# Patient Record
Sex: MALE | Race: WHITE | NOT HISPANIC OR LATINO
[De-identification: names, ages, dates, MRNs, and addresses within clinical notes are randomized per-mention and may not be internally consistent; named-entity substitution may affect disease eponyms.]

---

## 2024-01-29 PROBLEM — Z00.00 ENCOUNTER FOR PREVENTIVE HEALTH EXAMINATION: Status: ACTIVE | Noted: 2024-01-29

## 2024-02-07 ENCOUNTER — NON-APPOINTMENT (OUTPATIENT)
Age: 54
End: 2024-02-07

## 2024-02-09 ENCOUNTER — APPOINTMENT (OUTPATIENT)
Dept: CARDIOLOGY | Facility: CLINIC | Age: 54
End: 2024-02-09
Payer: COMMERCIAL

## 2024-02-09 VITALS
DIASTOLIC BLOOD PRESSURE: 84 MMHG | SYSTOLIC BLOOD PRESSURE: 130 MMHG | HEART RATE: 82 BPM | BODY MASS INDEX: 32.29 KG/M2 | WEIGHT: 218 LBS | HEIGHT: 69 IN

## 2024-02-09 DIAGNOSIS — Z82.49 FAMILY HISTORY OF ISCHEMIC HEART DISEASE AND OTHER DISEASES OF THE CIRCULATORY SYSTEM: ICD-10-CM

## 2024-02-09 DIAGNOSIS — I25.84 ATHEROSCLEROTIC HEART DISEASE OF NATIVE CORONARY ARTERY W/OUT ANGINA PECTORIS: ICD-10-CM

## 2024-02-09 DIAGNOSIS — I25.10 ATHEROSCLEROTIC HEART DISEASE OF NATIVE CORONARY ARTERY W/OUT ANGINA PECTORIS: ICD-10-CM

## 2024-02-09 DIAGNOSIS — Z87.891 PERSONAL HISTORY OF NICOTINE DEPENDENCE: ICD-10-CM

## 2024-02-09 DIAGNOSIS — E78.5 HYPERLIPIDEMIA, UNSPECIFIED: ICD-10-CM

## 2024-02-09 PROCEDURE — 93000 ELECTROCARDIOGRAM COMPLETE: CPT

## 2024-02-09 PROCEDURE — 99204 OFFICE O/P NEW MOD 45 MIN: CPT | Mod: 25

## 2024-02-09 RX ORDER — ATORVASTATIN CALCIUM 20 MG/1
20 TABLET, FILM COATED ORAL
Qty: 90 | Refills: 1 | Status: ACTIVE | COMMUNITY
Start: 2024-02-09 | End: 1900-01-01

## 2024-02-09 RX ORDER — LEVOTHYROXINE SODIUM 0.07 MG/1
75 TABLET ORAL DAILY
Refills: 0 | Status: ACTIVE | COMMUNITY

## 2024-02-09 RX ORDER — OMEGA-3-ACID ETHYL ESTERS 1 G/1
1 CAPSULE, LIQUID FILLED ORAL DAILY
Refills: 0 | Status: ACTIVE | COMMUNITY

## 2024-02-10 PROBLEM — I25.10 CORONARY ARTERY CALCIFICATION: Status: ACTIVE | Noted: 2024-02-10

## 2024-02-10 PROBLEM — E78.5 HLD (HYPERLIPIDEMIA): Status: ACTIVE | Noted: 2024-02-09

## 2024-02-10 NOTE — HISTORY OF PRESENT ILLNESS
[FreeTextEntry1] : 53-year-old male with a PMHx of hypothyroid, HLD  Pt presents for a cardiac evaluation. He was referred by his PCP Dr. Bowen. Coronary calcium score 1/2024: 297.  Patient denies CP, SOB.   Trig 161 TSH 3.31

## 2024-02-10 NOTE — ASSESSMENT
[FreeTextEntry1] : 53 YOM former smoking. HLD Coronary artery calcification at 90th percentile for his age.  Plan: Low-fat diet discussed. Start Atorvastatin 20 mg daily. Exercise stress ECHO to r/o obstructive CAD. F/u after the test.  Russell Fernández MD

## 2024-03-12 ENCOUNTER — APPOINTMENT (OUTPATIENT)
Dept: CARDIOLOGY | Facility: CLINIC | Age: 54
End: 2024-03-12

## 2024-12-29 ENCOUNTER — INPATIENT (INPATIENT)
Facility: HOSPITAL | Age: 54
LOS: 1 days | Discharge: HOME | DRG: 312 | End: 2024-12-31
Attending: HOSPITALIST | Admitting: INTERNAL MEDICINE
Payer: COMMERCIAL

## 2024-12-29 VITALS
WEIGHT: 203.05 LBS | OXYGEN SATURATION: 100 % | SYSTOLIC BLOOD PRESSURE: 120 MMHG | HEART RATE: 60 BPM | TEMPERATURE: 97 F | DIASTOLIC BLOOD PRESSURE: 85 MMHG | RESPIRATION RATE: 19 BRPM

## 2024-12-29 LAB
ALBUMIN SERPL ELPH-MCNC: 4.3 G/DL — SIGNIFICANT CHANGE UP (ref 3.5–5.2)
ALP SERPL-CCNC: 83 U/L — SIGNIFICANT CHANGE UP (ref 30–115)
ALT FLD-CCNC: 27 U/L — SIGNIFICANT CHANGE UP (ref 0–41)
ANION GAP SERPL CALC-SCNC: 10 MMOL/L — SIGNIFICANT CHANGE UP (ref 7–14)
AST SERPL-CCNC: 19 U/L — SIGNIFICANT CHANGE UP (ref 0–41)
BASE EXCESS BLDV CALC-SCNC: 4.5 MMOL/L — HIGH (ref -2–3)
BASOPHILS # BLD AUTO: 0.06 K/UL — SIGNIFICANT CHANGE UP (ref 0–0.2)
BASOPHILS NFR BLD AUTO: 0.6 % — SIGNIFICANT CHANGE UP (ref 0–1)
BILIRUB SERPL-MCNC: 0.8 MG/DL — SIGNIFICANT CHANGE UP (ref 0.2–1.2)
BUN SERPL-MCNC: 12 MG/DL — SIGNIFICANT CHANGE UP (ref 10–20)
CA-I SERPL-SCNC: 1.18 MMOL/L — SIGNIFICANT CHANGE UP (ref 1.15–1.33)
CALCIUM SERPL-MCNC: 9.5 MG/DL — SIGNIFICANT CHANGE UP (ref 8.4–10.5)
CHLORIDE SERPL-SCNC: 98 MMOL/L — SIGNIFICANT CHANGE UP (ref 98–110)
CO2 SERPL-SCNC: 27 MMOL/L — SIGNIFICANT CHANGE UP (ref 17–32)
CREAT SERPL-MCNC: 0.9 MG/DL — SIGNIFICANT CHANGE UP (ref 0.7–1.5)
EGFR: 102 ML/MIN/1.73M2 — SIGNIFICANT CHANGE UP
EOSINOPHIL # BLD AUTO: 0.06 K/UL — SIGNIFICANT CHANGE UP (ref 0–0.7)
EOSINOPHIL NFR BLD AUTO: 0.6 % — SIGNIFICANT CHANGE UP (ref 0–8)
GAS PNL BLDV: 130 MMOL/L — LOW (ref 136–145)
GAS PNL BLDV: SIGNIFICANT CHANGE UP
GLUCOSE SERPL-MCNC: 118 MG/DL — HIGH (ref 70–99)
HCO3 BLDV-SCNC: 32 MMOL/L — HIGH (ref 22–29)
HCT VFR BLD CALC: 47.4 % — SIGNIFICANT CHANGE UP (ref 42–52)
HCT VFR BLDA CALC: 64 % — CRITICAL HIGH (ref 39–51)
HGB BLD CALC-MCNC: >20 G/DL — CRITICAL HIGH (ref 12.6–17.4)
HGB BLD-MCNC: 16.1 G/DL — SIGNIFICANT CHANGE UP (ref 14–18)
IMM GRANULOCYTES NFR BLD AUTO: 0.3 % — SIGNIFICANT CHANGE UP (ref 0.1–0.3)
LACTATE BLDV-MCNC: 1.9 MMOL/L — SIGNIFICANT CHANGE UP (ref 0.5–2)
LACTATE SERPL-SCNC: 1.3 MMOL/L — SIGNIFICANT CHANGE UP (ref 0.7–2)
LYMPHOCYTES # BLD AUTO: 1 K/UL — LOW (ref 1.2–3.4)
LYMPHOCYTES # BLD AUTO: 9.3 % — LOW (ref 20.5–51.1)
MAGNESIUM SERPL-MCNC: 2.1 MG/DL — SIGNIFICANT CHANGE UP (ref 1.8–2.4)
MCHC RBC-ENTMCNC: 30.6 PG — SIGNIFICANT CHANGE UP (ref 27–31)
MCHC RBC-ENTMCNC: 34 G/DL — SIGNIFICANT CHANGE UP (ref 32–37)
MCV RBC AUTO: 90.1 FL — SIGNIFICANT CHANGE UP (ref 80–94)
MONOCYTES # BLD AUTO: 0.45 K/UL — SIGNIFICANT CHANGE UP (ref 0.1–0.6)
MONOCYTES NFR BLD AUTO: 4.2 % — SIGNIFICANT CHANGE UP (ref 1.7–9.3)
NEUTROPHILS # BLD AUTO: 9.16 K/UL — HIGH (ref 1.4–6.5)
NEUTROPHILS NFR BLD AUTO: 85 % — HIGH (ref 42.2–75.2)
NRBC # BLD: 0 /100 WBCS — SIGNIFICANT CHANGE UP (ref 0–0)
PCO2 BLDV: 54 MMHG — SIGNIFICANT CHANGE UP (ref 42–55)
PH BLDV: 7.38 — SIGNIFICANT CHANGE UP (ref 7.32–7.43)
PLATELET # BLD AUTO: 196 K/UL — SIGNIFICANT CHANGE UP (ref 130–400)
PMV BLD: 10 FL — SIGNIFICANT CHANGE UP (ref 7.4–10.4)
PO2 BLDV: 27 MMHG — SIGNIFICANT CHANGE UP (ref 25–45)
POTASSIUM BLDV-SCNC: 4.3 MMOL/L — SIGNIFICANT CHANGE UP (ref 3.5–5.1)
POTASSIUM SERPL-MCNC: 4.3 MMOL/L — SIGNIFICANT CHANGE UP (ref 3.5–5)
POTASSIUM SERPL-SCNC: 4.3 MMOL/L — SIGNIFICANT CHANGE UP (ref 3.5–5)
PROT SERPL-MCNC: 7.2 G/DL — SIGNIFICANT CHANGE UP (ref 6–8)
RBC # BLD: 5.26 M/UL — SIGNIFICANT CHANGE UP (ref 4.7–6.1)
RBC # FLD: 12.6 % — SIGNIFICANT CHANGE UP (ref 11.5–14.5)
SAO2 % BLDV: 44.1 % — LOW (ref 67–88)
SODIUM SERPL-SCNC: 135 MMOL/L — SIGNIFICANT CHANGE UP (ref 135–146)
TROPONIN T, HIGH SENSITIVITY RESULT: 8 NG/L — SIGNIFICANT CHANGE UP (ref 6–21)
WBC # BLD: 10.76 K/UL — SIGNIFICANT CHANGE UP (ref 4.8–10.8)
WBC # FLD AUTO: 10.76 K/UL — SIGNIFICANT CHANGE UP (ref 4.8–10.8)

## 2024-12-29 PROCEDURE — 83735 ASSAY OF MAGNESIUM: CPT

## 2024-12-29 PROCEDURE — 36415 COLL VENOUS BLD VENIPUNCTURE: CPT

## 2024-12-29 PROCEDURE — 82550 ASSAY OF CK (CPK): CPT

## 2024-12-29 PROCEDURE — 80053 COMPREHEN METABOLIC PANEL: CPT

## 2024-12-29 PROCEDURE — 95819 EEG AWAKE AND ASLEEP: CPT

## 2024-12-29 PROCEDURE — 84439 ASSAY OF FREE THYROXINE: CPT

## 2024-12-29 PROCEDURE — 93010 ELECTROCARDIOGRAM REPORT: CPT

## 2024-12-29 PROCEDURE — 71045 X-RAY EXAM CHEST 1 VIEW: CPT | Mod: 26

## 2024-12-29 PROCEDURE — A9579: CPT

## 2024-12-29 PROCEDURE — 93880 EXTRACRANIAL BILAT STUDY: CPT

## 2024-12-29 PROCEDURE — 99222 1ST HOSP IP/OBS MODERATE 55: CPT

## 2024-12-29 PROCEDURE — 84484 ASSAY OF TROPONIN QUANT: CPT

## 2024-12-29 PROCEDURE — 80307 DRUG TEST PRSMV CHEM ANLYZR: CPT

## 2024-12-29 PROCEDURE — 70450 CT HEAD/BRAIN W/O DYE: CPT | Mod: 26,MC

## 2024-12-29 PROCEDURE — 84481 FREE ASSAY (FT-3): CPT

## 2024-12-29 PROCEDURE — 85025 COMPLETE CBC W/AUTO DIFF WBC: CPT

## 2024-12-29 PROCEDURE — 99285 EMERGENCY DEPT VISIT HI MDM: CPT

## 2024-12-29 PROCEDURE — 93307 TTE W/O DOPPLER COMPLETE: CPT

## 2024-12-29 PROCEDURE — 75561 CARDIAC MRI FOR MORPH W/DYE: CPT

## 2024-12-29 RX ORDER — LEVOTHYROXINE SODIUM 175 UG/1
75 TABLET ORAL DAILY
Refills: 0 | Status: DISCONTINUED | OUTPATIENT
Start: 2024-12-29 | End: 2024-12-31

## 2024-12-29 RX ORDER — LEVOTHYROXINE SODIUM 175 UG/1
1 TABLET ORAL
Refills: 0 | DISCHARGE

## 2024-12-29 RX ORDER — ENOXAPARIN SODIUM 60 MG/.6ML
40 INJECTION INTRAVENOUS; SUBCUTANEOUS EVERY 24 HOURS
Refills: 0 | Status: DISCONTINUED | OUTPATIENT
Start: 2024-12-29 | End: 2024-12-31

## 2024-12-29 RX ORDER — SODIUM CHLORIDE 9 MG/ML
1000 INJECTION, SOLUTION INTRAVENOUS ONCE
Refills: 0 | Status: COMPLETED | OUTPATIENT
Start: 2024-12-29 | End: 2024-12-29

## 2024-12-29 RX ADMIN — SODIUM CHLORIDE 1000 MILLILITER(S): 9 INJECTION, SOLUTION INTRAVENOUS at 11:38

## 2024-12-29 NOTE — H&P ADULT - ATTENDING COMMENTS
Medicine Attending Addendum  Patient was seen and examined with medicine team.  Nursing records reviewed. I agree with the resident/PA/NP's note including past medical history, home medications, social history, allergies, surgical history, family history, and review of system. I have reviewed relevant vitals, laboratory values, imaging studies, and microbiology.   - Above resident's note was edited by me  - 54 y.o M presenting for syncope of unknown etiology. Admitted to [facility] for syncope work up.  - rest of A/P as per detailed housestaff note above except above modifications    Total time spent to complete patient's bedside assessment, reviewed medical chart, discussed medical plan of care with team was 45 minutes with >50% of time spent face to face with patient, discussion with patient/family and/or coordination of care. Medicine Attending Addendum  Patient was seen and examined with medicine team.  Nursing records reviewed. I agree with the resident/PA/NP's note including past medical history, home medications, social history, allergies, surgical history, family history, and review of system. I have reviewed relevant vitals, laboratory values, imaging studies, and microbiology.   - Above resident's note was edited by me  - 54 y.o M presenting for syncope of unknown etiology, likely vasovagal. Admitted to [facility] for syncope work up.  - rest of A/P as per detailed housestaff note above except above modifications    Total time spent to complete patient's bedside assessment, reviewed medical chart, discussed medical plan of care with team was 45 minutes with >50% of time spent face to face with patient, discussion with patient/family and/or coordination of care.

## 2024-12-29 NOTE — CONSULT NOTE ADULT - ASSESSMENT
53yo w PMH of hypothyroid and hld presents w seizure-like episode Pt admits to prodrome of dizziness and lightheadedness, states he does not recall episode, but wife states he had full body shaking and had urinary incontinence. Following event he felt generally weak. On exam, pt is back to baseline, no focal neurologic deficits noted. Sx may be suggestive of convulsive syncope given prodrome of dizziness/lightheadedness, pt should have further w/u to eval for seizure as well.     RECS  - Obtain orthostatics  - Cardiac syncope w/u  - telemetry monitoring   - Tiago    Discussed w Dr. Smith

## 2024-12-29 NOTE — ED PROVIDER NOTE - OBJECTIVE STATEMENT
54yoM PMHx hypothyroidism presenting for new witnessed seizure this morning. Pt report he had just gotten up out of bed, went downstairs sat on the couch when his wife saw him have a staring episode and then generalized shaking for about 5 minutes. He took about 10-15 minutes to return to baseline. Pt has otherwise been well recently, denies recent illness, fever, or trauma. Pt notes that after regularly tracking his TSH for years, it was elevated on routine labs for the first time 5 days ago, TSH 15. Pt reports feeling tired now, but no other symptoms. Denies chest pain, sob, cough, congestion, runny nose, abdominal pain, nausea, vomiting, headache, dizziness

## 2024-12-29 NOTE — ED ADULT TRIAGE NOTE - NS ED NURSE AMBULANCES
From: Chrystal Alexis  To: Alicia Marquez MD  Sent: 3/26/2020 11:39 AM CDT  Subject: Non-Urgent Medical Question    Yes If you could send a Z[facility]ft 100 [facility] and [facility] that would be great.    Thank you,  Chrystal Alexis  [phone]  
[employer]

## 2024-12-29 NOTE — H&P ADULT - HISTORY OF PRESENT ILLNESS
55yo male w PMH of:   - hypothyroidism     presents for LOC    The patient woke up today to his son's screams and found him on the bathroom floor complaining of a thigh injury.   After fetching his son a glass of water, the patient experienced dizziness and lightheadedness.     He went to his bedroom, sat on the bed, and told his wife he felt unwell before losing consciousness.   His wife witnessed the event, reporting it lasted approximately 2 minutes and involved shaking of his face and upper extremities.   Urinary incontinence occurred.   The patient recalls having the urge to urinate prior to the event but did not void cause he was busy with his son.     Upon regaining consciousness, there was no post-ictal phase or tongue biting.   Pt denies aura preceding LOC such as rich vu feeling or visual disturbances   Patient states he now feels back to baseline.     He reports a similar episode with loss of consciousness and shaking after a hand injury at age 14, triggered by the sight of blood.   Patient reports that he's been under stress as his soon is traveling for education abroad on January 2nd.     He denies chest pain, shortness of breath, or palpitations.   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  Patient reports that he has been on the levothyroxine 75 mcg for the past 2 years and TSH was controlled; until 5 days ago when he did TSH and it was 14.   Prior to that blood work, patient had flu like illness.     Family hx: father MI in the 50s -> non-obstructive CAD on [facility].         In the ED   Vitals on presentation  · BP Systolic	120 mm Hg  · BP Diastolic	85 mm Hg  · Heart Rate	60 /min  · Respiration Rate (breaths/min)	19 /min  · Temp (C)	36.1 Degrees C  · SpO2 (%)	100 % room air    Lactate 1.3   CBC-CMP- VBG within Nl limits    CT head: Unremarkable exam. No hydrocephalus, acute intracranial hemorrhage, or mass effec

## 2024-12-29 NOTE — CONSULT NOTE ADULT - ATTENDING COMMENTS
Patient seen and examined and agree with above except as noted.  Patients history, notes, labs, imaging, vitals and meds reviewed personally.  Episode possibly vagal convulsive syncope   Currently back to baseline    Plan as above

## 2024-12-29 NOTE — CONSULT NOTE ADULT - SUBJECTIVE AND OBJECTIVE BOX
Neurology Consult    HPI  53yo male w PMH of hypothyroid and hld presents for seizure-like episode. Pt states he recalls getting water, he then felt dizzy and lightheaded, sat down and did not recall events following. Pt states wife witnessed him have full body shaking and pt had episode of urinary incontinence. Following event pt felt generally weak, he states he now feels back to baseline. Pt denies aura preceding seizure such as rihc vu feeling oe visual disturbances       PAST MEDICAL & SURGICAL HISTORY:      FAMILY HISTORY:      Social History: (-) x 3    Allergies    No Known Allergies    Intolerances        MEDICATIONS  (STANDING):    MEDICATIONS  (PRN):      Review of systems:    see hpi    Vital Signs Last 24 Hrs  T(C): 36.1 (29 Dec 2024 08:40), Max: 36.1 (29 Dec 2024 08:40)  T(F): 97 (29 Dec 2024 08:40), Max: 97 (29 Dec 2024 08:40)  HR: 60 (29 Dec 2024 08:40) (60 - 60)  BP: 120/85 (29 Dec 2024 08:40) (120/85 - 120/85)  BP(mean): --  RR: 19 (29 Dec 2024 08:40) (19 - 19)  SpO2: 100% (29 Dec 2024 08:40) (100% - 100%)    Parameters below as of 29 Dec 2024 08:40  Patient On (Oxygen Delivery Method): room air        Examination:  General:  Appearance is consistent with chronologic age.  No abnormal facies.  Gross skin survey within normal limits.    Cognitive/Language:  The patient is oriented to person, place, time and date.  Recent and remote memory intact.  Fund of knowledge is intact and normal.  Language with normal repetition, comprehension and naming.  Nondysarthric.    Eyes: intact VA, VFF.  EOMI w/o nystagmus, skew or reported double vision.  PERRL.  No ptosis/weakness of eyelid closure.    Face:  Facial sensation normal V1 - 3, no facial asymmetry.    Ears/Nose/Throat:  Hearing grossly intact b/l.  Palate elevates midline.  Tongue and uvula midline.   Motor examination:   Normal tone, bulk and range of motion.  No tenderness, twitching, tremors or involuntary movements.  Formal Muscle Strength Testing: (MRC grade R/L) 5/5 UE; 5/5 LE.  No observable drift.  Sensory examination:   Intact to light touch in all extremities.  Cerebellum:   FTN/HKS intact with normal CORRIE in all limbs.  No dysmetria or dysdiadokinesia.  Gait narrow based and normal.      Labs:   CBC Full  -  ( 29 Dec 2024 10:52 )  WBC Count : 10.76 K/uL  RBC Count : 5.26 M/uL  Hemoglobin : 16.1 g/dL  Hematocrit : 47.4 %  Platelet Count - Automated : 196 K/uL  Mean Cell Volume : 90.1 fL  Mean Cell Hemoglobin : 30.6 pg  Mean Cell Hemoglobin Concentration : 34.0 g/dL  Auto Neutrophil # : 9.16 K/uL  Auto Lymphocyte # : 1.00 K/uL  Auto Monocyte # : 0.45 K/uL  Auto Eosinophil # : 0.06 K/uL  Auto Basophil # : 0.06 K/uL  Auto Neutrophil % : 85.0 %  Auto Lymphocyte % : 9.3 %  Auto Monocyte % : 4.2 %  Auto Eosinophil % : 0.6 %  Auto Basophil % : 0.6 %    12-29    135  |  98  |  12  ----------------------------<  118[H]  4.3   |  27  |  0.9    Ca    9.5      29 Dec 2024 10:52  Mg     2.1     12-29    TPro  7.2  /  Alb  4.3  /  TBili  0.8  /  DBili  x   /  AST  19  /  ALT  27  /  AlkPhos  83  12-29    LIVER FUNCTIONS - ( 29 Dec 2024 10:52 )  Alb: 4.3 g/dL / Pro: 7.2 g/dL / ALK PHOS: 83 U/L / ALT: 27 U/L / AST: 19 U/L / GGT: x             Urinalysis Basic - ( 29 Dec 2024 10:52 )    Color: x / Appearance: x / SG: x / pH: x  Gluc: 118 mg/dL / Ketone: x  / Bili: x / Urobili: x   Blood: x / Protein: x / Nitrite: x   Leuk Esterase: x / RBC: x / WBC x   Sq Epi: x / Non Sq Epi: x / Bacteria: x          Neuroimaging:  NCHCT:     12-29-24 @ 12:04

## 2024-12-29 NOTE — H&P ADULT - ASSESSMENT
55yo male w PMH of:   - hypothyroidism   - HLD     presents for seizure-like episode.   Pt states he recalls getting water, he then felt dizzy and lightheaded, sat down and did not recall events following.     Pt states wife witnessed him have full body shaking and pt had episode of urinary incontinence.     Following event pt felt generally weak, he states he now feels back to baseline.     Pt denies aura preceding seizure such as rich vu feeling or visual disturbances       In the ED   Vitals on presentation  · BP Systolic	120 mm Hg  · BP Diastolic	85 mm Hg  · Heart Rate	60 /min  · Respiration Rate (breaths/min)	19 /min  · Temp (C)	36.1 Degrees C  · SpO2 (%)	100 % room air    Lactate 1.3   CBC-CMP- VBG within Nl limits    CT head: Unremarkable exam. No hydrocephalus, acute intracranial hemorrhage, or mass effect        #Syncope w seizure-like episode and prodrome of dizziness and lightheadedness  - CBC-CMP- VBG within Nl limits  -  neuro recs appreciated: likely convulsive syncope  - Obtain orthostatics  - telemetry monitoring   - rEEG  - fup alcohol level and drug screen       #Diet: regular   #DVT pro:   #GI pro: pantoprazole  #Activity: as tolerated  #Dispo:   #Med rec:   #Code status:    54 y.o M presenting for syncope    #Syncope w seizure-like episode and prodrome of dizziness and lightheadedness  - likely vasovagal in the setting of stress  - CBC-CMP- VBG- lactate within Nl limits  - CT head negative   - ECG no ischemic changes; normal sinus rhythm  - CXR: no acute changes  - neuro and cardiac exams normal  - trop negative -> f up repeat   -  neuro recs appreciated: likely convulsive syncope  - Obtain orthostatics  - telemetry monitoring   - rEEG  - fup alcohol level and drug screen   - TTE   - carotid duplex       #Hypothyroidism   - no palpable nodules on exam; no goiter   - c/w levothyroxine 75 mcg   - f up TSH (OP was 14)  - f up T3-T4    #Diet: regular   #DVT pro: lovenox  #GI pro: not indicated  #Activity: as tolerated  #Dispo: tele  #Med rec: done  #Code status: full    Prepare for discharge in 24 hours   54 y.o M presenting for syncope    #Syncope w seizure-like episode and prodrome of dizziness and lightheadedness  - likely vasovagal in the setting of stress  - CBC-CMP- VBG- lactate within Nl limits  - CT head negative   - ECG no ischemic changes; normal sinus rhythm  - CXR: no acute changes  - neuro and cardiac exams normal  - trop negative -> f up repeat   -  neuro recs appreciated: likely convulsive syncope  - Obtain orthostatics  - telemetry monitoring   - rEEG  - fup alcohol level and drug screen   - TTE   - carotid duplex   - f up CPK       #Hypothyroidism   - no palpable nodules on exam; no goiter   - c/w levothyroxine 75 mcg   - f up TSH (OP was 14)  - f up T3-T4    #Diet: regular   #DVT pro: lovenox  #GI pro: not indicated  #Activity: as tolerated  #Dispo: tele  #Med rec: done  #Code status: full    Prepare for discharge in 24 hours

## 2024-12-29 NOTE — ED PROVIDER NOTE - CLINICAL SUMMARY MEDICAL DECISION MAKING FREE TEXT BOX
Statement Selected 54-year-old history of hypothyroidism 20 for new witnessed seizure no history of seizures.  Postictal for approximately 30 minutes.  Head CT negative.  Labs unremarkable chair decision making regarding admission versus outpatient follow-up for new onset seizure/thyroid issues.  Patient prefers admission due to lack of follow-up.  Will admit.    I personally evaluated patient. I agree with the findings and plan with all documentation on chart except as documented  in my note.      ED work up reviewed and results and plan of care discussed with patient. Patient requires admission for further work up, monitoring, and management. Need for admission discussed with patient.

## 2024-12-30 ENCOUNTER — TRANSCRIPTION ENCOUNTER (OUTPATIENT)
Age: 54
End: 2024-12-30

## 2024-12-30 ENCOUNTER — RESULT REVIEW (OUTPATIENT)
Age: 54
End: 2024-12-30

## 2024-12-30 LAB
ALBUMIN SERPL ELPH-MCNC: 4.1 G/DL — SIGNIFICANT CHANGE UP (ref 3.5–5.2)
ALP SERPL-CCNC: 74 U/L — SIGNIFICANT CHANGE UP (ref 30–115)
ALT FLD-CCNC: 22 U/L — SIGNIFICANT CHANGE UP (ref 0–41)
ANION GAP SERPL CALC-SCNC: 10 MMOL/L — SIGNIFICANT CHANGE UP (ref 7–14)
AST SERPL-CCNC: 15 U/L — SIGNIFICANT CHANGE UP (ref 0–41)
BASOPHILS # BLD AUTO: 0.06 K/UL — SIGNIFICANT CHANGE UP (ref 0–0.2)
BASOPHILS NFR BLD AUTO: 0.9 % — SIGNIFICANT CHANGE UP (ref 0–1)
BILIRUB SERPL-MCNC: 0.7 MG/DL — SIGNIFICANT CHANGE UP (ref 0.2–1.2)
BUN SERPL-MCNC: 13 MG/DL — SIGNIFICANT CHANGE UP (ref 10–20)
CALCIUM SERPL-MCNC: 9.5 MG/DL — SIGNIFICANT CHANGE UP (ref 8.4–10.5)
CHLORIDE SERPL-SCNC: 104 MMOL/L — SIGNIFICANT CHANGE UP (ref 98–110)
CK SERPL-CCNC: 80 U/L — SIGNIFICANT CHANGE UP (ref 0–225)
CO2 SERPL-SCNC: 26 MMOL/L — SIGNIFICANT CHANGE UP (ref 17–32)
CREAT SERPL-MCNC: 0.9 MG/DL — SIGNIFICANT CHANGE UP (ref 0.7–1.5)
EGFR: 102 ML/MIN/1.73M2 — SIGNIFICANT CHANGE UP
EOSINOPHIL # BLD AUTO: 0.16 K/UL — SIGNIFICANT CHANGE UP (ref 0–0.7)
EOSINOPHIL NFR BLD AUTO: 2.4 % — SIGNIFICANT CHANGE UP (ref 0–8)
ETHANOL SERPL-MCNC: <10 MG/DL — SIGNIFICANT CHANGE UP
GLUCOSE SERPL-MCNC: 112 MG/DL — HIGH (ref 70–99)
HCT VFR BLD CALC: 45.2 % — SIGNIFICANT CHANGE UP (ref 42–52)
HGB BLD-MCNC: 15.1 G/DL — SIGNIFICANT CHANGE UP (ref 14–18)
IMM GRANULOCYTES NFR BLD AUTO: 0.3 % — SIGNIFICANT CHANGE UP (ref 0.1–0.3)
LYMPHOCYTES # BLD AUTO: 1.56 K/UL — SIGNIFICANT CHANGE UP (ref 1.2–3.4)
LYMPHOCYTES # BLD AUTO: 23 % — SIGNIFICANT CHANGE UP (ref 20.5–51.1)
MAGNESIUM SERPL-MCNC: 2 MG/DL — SIGNIFICANT CHANGE UP (ref 1.8–2.4)
MCHC RBC-ENTMCNC: 30.4 PG — SIGNIFICANT CHANGE UP (ref 27–31)
MCHC RBC-ENTMCNC: 33.4 G/DL — SIGNIFICANT CHANGE UP (ref 32–37)
MCV RBC AUTO: 91.1 FL — SIGNIFICANT CHANGE UP (ref 80–94)
MONOCYTES # BLD AUTO: 0.43 K/UL — SIGNIFICANT CHANGE UP (ref 0.1–0.6)
MONOCYTES NFR BLD AUTO: 6.3 % — SIGNIFICANT CHANGE UP (ref 1.7–9.3)
NEUTROPHILS # BLD AUTO: 4.56 K/UL — SIGNIFICANT CHANGE UP (ref 1.4–6.5)
NEUTROPHILS NFR BLD AUTO: 67.1 % — SIGNIFICANT CHANGE UP (ref 42.2–75.2)
NRBC # BLD: 0 /100 WBCS — SIGNIFICANT CHANGE UP (ref 0–0)
PLATELET # BLD AUTO: 205 K/UL — SIGNIFICANT CHANGE UP (ref 130–400)
PMV BLD: 10.2 FL — SIGNIFICANT CHANGE UP (ref 7.4–10.4)
POTASSIUM SERPL-MCNC: 4.9 MMOL/L — SIGNIFICANT CHANGE UP (ref 3.5–5)
POTASSIUM SERPL-SCNC: 4.9 MMOL/L — SIGNIFICANT CHANGE UP (ref 3.5–5)
PROT SERPL-MCNC: 6.4 G/DL — SIGNIFICANT CHANGE UP (ref 6–8)
RBC # BLD: 4.96 M/UL — SIGNIFICANT CHANGE UP (ref 4.7–6.1)
RBC # FLD: 12.7 % — SIGNIFICANT CHANGE UP (ref 11.5–14.5)
SODIUM SERPL-SCNC: 140 MMOL/L — SIGNIFICANT CHANGE UP (ref 135–146)
T4 FREE SERPL-MCNC: 0.9 NG/DL — SIGNIFICANT CHANGE UP (ref 0.9–1.7)
TROPONIN T, HIGH SENSITIVITY RESULT: <6 NG/L — SIGNIFICANT CHANGE UP (ref 6–21)
TSH SERPL-MCNC: 14.76 UIU/ML — HIGH (ref 0.27–4.2)
WBC # BLD: 6.79 K/UL — SIGNIFICANT CHANGE UP (ref 4.8–10.8)
WBC # FLD AUTO: 6.79 K/UL — SIGNIFICANT CHANGE UP (ref 4.8–10.8)

## 2024-12-30 PROCEDURE — 93880 EXTRACRANIAL BILAT STUDY: CPT | Mod: 26

## 2024-12-30 PROCEDURE — 99233 SBSQ HOSP IP/OBS HIGH 50: CPT

## 2024-12-30 PROCEDURE — 99222 1ST HOSP IP/OBS MODERATE 55: CPT

## 2024-12-30 PROCEDURE — 93306 TTE W/DOPPLER COMPLETE: CPT | Mod: 26

## 2024-12-30 RX ADMIN — LEVOTHYROXINE SODIUM 75 MICROGRAM(S): 175 TABLET ORAL at 05:23

## 2024-12-30 NOTE — PROGRESS NOTE ADULT - SUBJECTIVE AND OBJECTIVE BOX
CHIEF COMPLAINT:    Patient is a 54y old  Male who presents with a chief complaint of syncope with seizure-like episode        INTERVAL HPI/OVERNIGHT EVENTS:    Patient seen and examined at bedside. No acute overnight events occurred.    ROS: Denies SOB, chest pain, dizziness. All other systems are negative.    Medications:  Standing  enoxaparin Injectable 40 milliGRAM(s) SubCutaneous every 24 hours  levothyroxine 75 MICROGram(s) Oral daily    PRN Meds        Vital Signs:    T(F): 97 (12-30-24 @ 12:44), Max: 98.6 (12-29-24 @ 19:47)  HR: 84 (12-30-24 @ 12:44) (73 - 92)  BP: 137/93 (12-30-24 @ 12:44) (107/65 - 152/84)  RR: 18 (12-30-24 @ 12:44) (18 - 18)  SpO2: 96% (12-30-24 @ 12:44) (96% - 98%)    PHYSICAL EXAM:  GENERAL:  NAD  SKIN: No rashes or lesions  HEENT: Atraumatic. Normocephalic. Anicteric  NECK:  No JVD.   PULMONARY: Clear to ausculation bilaterally. No wheezing. No rales  CVS: Normal S1, S2. Regular rate and rhythm. No murmurs.  ABDOMEN/GI: Soft, Nontender, Nondistended; Bowel sounds are present  EXTREMITIES:  No edema B/L LE.  NEUROLOGIC:  No motor deficit.  PSYCH: Alert & oriented x 3, normal affect      LABS:                        15.1   6.79  )-----------( 205      ( 30 Dec 2024 06:28 )             45.2     12-30    140  |  104  |  13  ----------------------------<  112[H]  4.9   |  26  |  0.9    Ca    9.5      30 Dec 2024 06:28  Mg     2.0     12-30    TPro  6.4  /  Alb  4.1  /  TBili  0.7  /  DBili  x   /  AST  15  /  ALT  22  /  AlkPhos  74  12-30              RADIOLOGY & ADDITIONAL TESTS:  Imaging or report Personally Reviewed:  [ ] YES  [ ] NO -->no new images    Telemetry reviewed independently - NSR, no acute events  EKG reviewed independently -->no new EKGs    Consultant(s) Notes Reviewed:  [ ] YES  [ ] NO  Care Discussed with Consultants/Other Providers [ ] YES  [ ] NO    Case discussed with resident  Care discussed with pt

## 2024-12-30 NOTE — DISCHARGE NOTE PROVIDER - NSDCCPCAREPLAN_GEN_ALL_CORE_FT
PRINCIPAL DISCHARGE DIAGNOSIS  Diagnosis: Syncope  Assessment and Plan of Treatment: Syncope is when you temporarily lose consciousness, also called fainting or passing out. It is caused by a sudden decrease in blood flow to the brain. Even though most causes of syncope are not dangerous, syncope can possibly be a sign of a serious medical problem. Signs that you may be about to faint include feeling dizzy, lightheaded, nausea, visual changes, or cold/clammy skin. Do not drive, operate heavy machinery, or play sports until your health care provider says it is okay.  SEEK IMMEDIATE MEDICAL CARE IF YOU HAVE ANY OF THE FOLLOWING SYMPTOMS: severe headache, pain in your chest/abdomen/back, bleeding from your mouth or rectum, palpitations, shortness of breath, pain with breathing, seizure, confusion, or trouble walking.        SECONDARY DISCHARGE DIAGNOSES  Diagnosis: Hypothyroidism  Assessment and Plan of Treatment:      PRINCIPAL DISCHARGE DIAGNOSIS  Diagnosis: Syncope  Assessment and Plan of Treatment: Syncope is when you temporarily lose consciousness, also called fainting or passing out. It is caused by a sudden decrease in blood flow to the brain. Even though most causes of syncope are not dangerous, syncope can possibly be a sign of a serious medical problem. Signs that you may be about to faint include feeling dizzy, lightheaded, nausea, visual changes, or cold/clammy skin. Do not drive, operate heavy machinery, or play sports until your health care provider says it is okay.  SEEK IMMEDIATE MEDICAL CARE IF YOU HAVE ANY OF THE FOLLOWING SYMPTOMS: severe headache, pain in your chest/abdomen/back, bleeding from your mouth or rectum, palpitations, shortness of breath, pain with breathing, seizure, confusion, or trouble walking.        SECONDARY DISCHARGE DIAGNOSES  Diagnosis: Hypothyroidism  Assessment and Plan of Treatment: Your TSH was elevated. Please continue with synthroid dose and follow up with your PMD/Endocrinologist in 1 week. Level could be elevated due to acute stress.     PRINCIPAL DISCHARGE DIAGNOSIS  Diagnosis: Syncope  Assessment and Plan of Treatment: Syncope is when you temporarily lose consciousness, also called fainting or passing out. It is caused by a sudden decrease in blood flow to the brain. Even though most causes of syncope are not dangerous, syncope can possibly be a sign of a serious medical problem. Signs that you may be about to faint include feeling dizzy, lightheaded, nausea, visual changes, or cold/clammy skin. Do not drive, operate heavy machinery, or play sports until your health care provider says it is okay.  SEEK IMMEDIATE MEDICAL CARE IF YOU HAVE ANY OF THE FOLLOWING SYMPTOMS: severe headache, pain in your chest/abdomen/back, bleeding from your mouth or rectum, palpitations, shortness of breath, pain with breathing, seizure, confusion, or trouble walking.        SECONDARY DISCHARGE DIAGNOSES  Diagnosis: Hypothyroidism  Assessment and Plan of Treatment: Your TSH was elevated. Please continue with synthroid dose and follow up with your PMD/Endocrinologist in 1 week. Level could be elevated due to acute stress.    Diagnosis: Cardiac mass  Assessment and Plan of Treatment:      PRINCIPAL DISCHARGE DIAGNOSIS  Diagnosis: Syncope  Assessment and Plan of Treatment: Syncope is when you temporarily lose consciousness, also called fainting or passing out. It is caused by a sudden decrease in blood flow to the brain. Even though most causes of syncope are not dangerous, syncope can possibly be a sign of a serious medical problem. Signs that you may be about to faint include feeling dizzy, lightheaded, nausea, visual changes, or cold/clammy skin. Do not drive, operate heavy machinery, or play sports until your health care provider says it is okay.  SEEK IMMEDIATE MEDICAL CARE IF YOU HAVE ANY OF THE FOLLOWING SYMPTOMS: severe headache, pain in your chest/abdomen/back, bleeding from your mouth or rectum, palpitations, shortness of breath, pain with breathing, seizure, confusion, or trouble walking.        SECONDARY DISCHARGE DIAGNOSES  Diagnosis: Hypothyroidism  Assessment and Plan of Treatment: Your TSH was elevated. Please continue with synthroid dose and follow up with your PMD/Endocrinologist in 1 week. Level could be elevated due to acute stress.    Diagnosis: Cardiac mass  Assessment and Plan of Treatment: Your echo showed possible mass. Cardiac MRI showed no evidentce intracardiac mass. "There is accessory myocardial papillary   muscle/trabeculation in the apical lateral wall, which may represent mass noted on transthoracic echocardiogram." Please follow up with a cardiologist for further work up ensure there is definitively no mass. Please avoid traveling until you see your primary or cardiologist

## 2024-12-30 NOTE — DISCHARGE NOTE PROVIDER - CARE PROVIDERS DIRECT ADDRESSES
,DirectAddress_Unknown ,DirectAddress_Unknown,[email or web address]

## 2024-12-30 NOTE — DISCHARGE NOTE PROVIDER - HOSPITAL COURSE
55yo male w PMH of:   - hypothyroidism     presents for LOC    The patient woke up today to his son's screams and found him on the bathroom floor complaining of a thigh injury.   After fetching his son a glass of water, the patient experienced dizziness and lightheadedness.     He went to his bedroom, sat on the bed, and told his wife he felt unwell before losing consciousness.   His wife witnessed the event, reporting it lasted approximately 2 minutes and involved shaking of his face and upper extremities.   Urinary incontinence occurred.   The patient recalls having the urge to urinate prior to the event but did not void cause he was busy with his son.     Upon regaining consciousness, there was no post-ictal phase or tongue biting.   Pt denies aura preceding LOC such as rich vu feeling or visual disturbances   Patient states he now feels back to baseline.     He reports a similar episode with loss of consciousness and shaking after a hand injury at age 14, triggered by the sight of blood.   Patient reports that he's been under stress as his son is traveling for education abroad on January 2nd.     He denies chest pain, shortness of breath, or palpitations.   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  Patient reports that he has been on the levothyroxine 75 mcg for the past 2 years and TSH was controlled; until 5 days ago when he did TSH and it was 14.   Prior to that blood work, patient had flu like illness.     Family hx: father MI in the 50s -> non-obstructive CAD on [facility].     In the ED   Vitals on presentation  · BP Systolic	120 mm Hg  · BP Diastolic	85 mm Hg  · Heart Rate	60 /min  · Respiration Rate (breaths/min)	19 /min  · Temp (C)	36.1 Degrees C  · SpO2 (%)	100 % room air    Lactate 1.3   CBC-CMP- VBG within Nl limits    CT head: Unremarkable exam. No hydrocephalus, acute intracranial hemorrhage, or mass effect      #Syncope w seizure-like episode and prodrome of dizziness and lightheadedness  - likely vasovagal in the setting of stress  - CBC-CMP- VBG- lactate within Nl limits  - CT head negative   - ECG no ischemic changes; normal sinus rhythm  - CXR: no acute changes  - neuro and cardiac exams normal  - trop negative   - neuro recs appreciated: likely convulsive syncope  - Orthostatics:  - rEEG:  - TTE:  - carotid duplex     #Hypothyroidism   - no palpable nodules on exam; no goiter   - c/w levothyroxine 75 mcg   - f up TSH (OP was 14)  - f up T3-T4    Discussion of discharge plan of care, including discharge diagnoses, medication reconciliation, and follow-ups was conducted with Dr. Johansen on 12/30/2024, and discharge was approved.         55yo male w PMH of:   - hypothyroidism     presents for LOC    The patient woke up to his son's screams and found him on the bathroom floor complaining of a thigh injury.   After fetching his son a glass of water, the patient experienced dizziness and lightheadedness.     He went to his bedroom, sat on the bed, and told his wife he felt unwell before losing consciousness.   His wife witnessed the event, reporting it lasted approximately 2 minutes and involved shaking of his face and upper extremities.   Urinary incontinence occurred.   The patient recalls having the urge to urinate prior to the event but did not void cause he was busy with his son.     Upon regaining consciousness, there was no post-ictal phase or tongue biting.   Pt denies aura preceding LOC such as rich vu feeling or visual disturbances   Patient states he now feels back to baseline.     He reports a similar episode with loss of consciousness and shaking after a hand injury at age 14, triggered by the sight of blood.   Patient reports that he's been under stress as his son is traveling for education abroad on January 2nd.     He denies chest pain, shortness of breath, or palpitations.   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  Patient reports that he has been on the levothyroxine 75 mcg for the past 2 years and TSH was controlled; until 5 days ago when he did TSH and it was 14.   Prior to that blood work, patient had flu like illness.         Lactate 1.3   CBC-CMP- VBG within Nl limits    CT head: Unremarkable exam. No hydrocephalus, acute intracranial hemorrhage, or mass effect      #Syncope w seizure-like episode and prodrome of dizziness and lightheadedness  - likely vasovagal in the setting of stress  - CBC-CMP- VBG- lactate within Nl limits  - CT head negative   - ECG no ischemic changes; normal sinus rhythm  - CXR: no acute changes  - neuro and cardiac exams normal  - trop negative   - neuro recs appreciated: likely convulsive syncope  - Orthostatics: negative  - rEEG: negative  - TTE: showed LV mass  - carotid duplex     #Hypothyroidism   - no palpable nodules on exam; no goiter   - c/w levothyroxine 75 mcg   - f up TSH (OP was 14)  - f up T3-T4    Discussion of discharge plan of care, including discharge diagnoses, medication reconciliation, and follow-ups was conducted with Dr. Johansen on 12/30/2024, and discharge was approved.

## 2024-12-30 NOTE — DISCHARGE NOTE PROVIDER - PROVIDER TOKENS
PROVIDER:[TOKEN:[[id number]],FOLLOWUP:[2 weeks]] PROVIDER:[TOKEN:[[id number]],FOLLOWUP:[2 weeks]],PROVIDER:[TOKEN:[[id number]],FOLLOWUP:[1 week]]

## 2024-12-30 NOTE — DISCHARGE NOTE PROVIDER - CARE PROVIDER_API CALL
Kasie Bowen  Internal Medicine  [address] [place] [address]  Phone: [phone]  Fax: [phone]  Follow Up Time: 2 weeks   Kasie Bowen  Internal Medicine  [facility] [place] [address]  Phone: [phone]  Fax: [phone]  Follow Up Time: 2 weeks    Philip Ledesma  Cardiovascular Disease  [address] [place] [address] [address]  [place] [address]  Phone: [phone]  Fax: [phone]  Follow Up Time: 1 week

## 2024-12-31 ENCOUNTER — TRANSCRIPTION ENCOUNTER (OUTPATIENT)
Age: 54
End: 2024-12-31

## 2024-12-31 VITALS
DIASTOLIC BLOOD PRESSURE: 79 MMHG | SYSTOLIC BLOOD PRESSURE: 151 MMHG | TEMPERATURE: 98 F | RESPIRATION RATE: 18 BRPM | HEART RATE: 100 BPM | OXYGEN SATURATION: 97 %

## 2024-12-31 LAB
ALBUMIN SERPL ELPH-MCNC: 4.2 G/DL — SIGNIFICANT CHANGE UP (ref 3.5–5.2)
ALP SERPL-CCNC: 76 U/L — SIGNIFICANT CHANGE UP (ref 30–115)
ALT FLD-CCNC: 24 U/L — SIGNIFICANT CHANGE UP (ref 0–41)
ANION GAP SERPL CALC-SCNC: 11 MMOL/L — SIGNIFICANT CHANGE UP (ref 7–14)
AST SERPL-CCNC: 18 U/L — SIGNIFICANT CHANGE UP (ref 0–41)
BASOPHILS # BLD AUTO: 0.08 K/UL — SIGNIFICANT CHANGE UP (ref 0–0.2)
BASOPHILS NFR BLD AUTO: 1.2 % — HIGH (ref 0–1)
BILIRUB SERPL-MCNC: 0.7 MG/DL — SIGNIFICANT CHANGE UP (ref 0.2–1.2)
BUN SERPL-MCNC: 14 MG/DL — SIGNIFICANT CHANGE UP (ref 10–20)
CALCIUM SERPL-MCNC: 9.4 MG/DL — SIGNIFICANT CHANGE UP (ref 8.4–10.4)
CHLORIDE SERPL-SCNC: 103 MMOL/L — SIGNIFICANT CHANGE UP (ref 98–110)
CO2 SERPL-SCNC: 27 MMOL/L — SIGNIFICANT CHANGE UP (ref 17–32)
CREAT SERPL-MCNC: 0.9 MG/DL — SIGNIFICANT CHANGE UP (ref 0.7–1.5)
EGFR: 102 ML/MIN/1.73M2 — SIGNIFICANT CHANGE UP
EOSINOPHIL # BLD AUTO: 0.25 K/UL — SIGNIFICANT CHANGE UP (ref 0–0.7)
EOSINOPHIL NFR BLD AUTO: 3.7 % — SIGNIFICANT CHANGE UP (ref 0–8)
GLUCOSE SERPL-MCNC: 99 MG/DL — SIGNIFICANT CHANGE UP (ref 70–99)
HCT VFR BLD CALC: 46.8 % — SIGNIFICANT CHANGE UP (ref 42–52)
HGB BLD-MCNC: 15.6 G/DL — SIGNIFICANT CHANGE UP (ref 14–18)
IMM GRANULOCYTES NFR BLD AUTO: 0.1 % — SIGNIFICANT CHANGE UP (ref 0.1–0.3)
LYMPHOCYTES # BLD AUTO: 1.71 K/UL — SIGNIFICANT CHANGE UP (ref 1.2–3.4)
LYMPHOCYTES # BLD AUTO: 25.1 % — SIGNIFICANT CHANGE UP (ref 20.5–51.1)
MCHC RBC-ENTMCNC: 30.3 PG — SIGNIFICANT CHANGE UP (ref 27–31)
MCHC RBC-ENTMCNC: 33.3 G/DL — SIGNIFICANT CHANGE UP (ref 32–37)
MCV RBC AUTO: 90.9 FL — SIGNIFICANT CHANGE UP (ref 80–94)
MONOCYTES # BLD AUTO: 0.5 K/UL — SIGNIFICANT CHANGE UP (ref 0.1–0.6)
MONOCYTES NFR BLD AUTO: 7.3 % — SIGNIFICANT CHANGE UP (ref 1.7–9.3)
NEUTROPHILS # BLD AUTO: 4.27 K/UL — SIGNIFICANT CHANGE UP (ref 1.4–6.5)
NEUTROPHILS NFR BLD AUTO: 62.6 % — SIGNIFICANT CHANGE UP (ref 42.2–75.2)
NRBC # BLD: 0 /100 WBCS — SIGNIFICANT CHANGE UP (ref 0–0)
PLATELET # BLD AUTO: 219 K/UL — SIGNIFICANT CHANGE UP (ref 130–400)
PMV BLD: 10.1 FL — SIGNIFICANT CHANGE UP (ref 7.4–10.4)
POTASSIUM SERPL-MCNC: 4.8 MMOL/L — SIGNIFICANT CHANGE UP (ref 3.5–5)
POTASSIUM SERPL-SCNC: 4.8 MMOL/L — SIGNIFICANT CHANGE UP (ref 3.5–5)
PROT SERPL-MCNC: 6.9 G/DL — SIGNIFICANT CHANGE UP (ref 6–8)
RBC # BLD: 5.15 M/UL — SIGNIFICANT CHANGE UP (ref 4.7–6.1)
RBC # FLD: 12.6 % — SIGNIFICANT CHANGE UP (ref 11.5–14.5)
SODIUM SERPL-SCNC: 141 MMOL/L — SIGNIFICANT CHANGE UP (ref 135–146)
T3FREE SERPL-MCNC: 2.32 PG/ML — SIGNIFICANT CHANGE UP (ref 2–4.4)
WBC # BLD: 6.82 K/UL — SIGNIFICANT CHANGE UP (ref 4.8–10.8)
WBC # FLD AUTO: 6.82 K/UL — SIGNIFICANT CHANGE UP (ref 4.8–10.8)

## 2024-12-31 PROCEDURE — 95816 EEG AWAKE AND DROWSY: CPT | Mod: 26

## 2024-12-31 PROCEDURE — 75561 CARDIAC MRI FOR MORPH W/DYE: CPT | Mod: 26

## 2024-12-31 PROCEDURE — 99238 HOSP IP/OBS DSCHRG MGMT 30/<: CPT

## 2024-12-31 RX ADMIN — Medication 1 GRAM(S): at 11:27

## 2024-12-31 RX ADMIN — LEVOTHYROXINE SODIUM 75 MICROGRAM(S): 175 TABLET ORAL at 05:51

## 2024-12-31 NOTE — DISCHARGE NOTE NURSING/CASE MANAGEMENT/SOCIAL WORK - FINANCIAL ASSISTANCE
[facility] provides services at a reduced cost to those who are determined to be eligible through [facility]’s financial assistance program. Information regarding [facility]’s financial assistance program can be found by going to [email or web address] or by calling [phone].

## 2024-12-31 NOTE — PROGRESS NOTE ADULT - SUBJECTIVE AND OBJECTIVE BOX
SUBJECTIVE/OVERNIGHT EVENTS  Today is hospital day 2d. This morning patient was seen and examined at bedside, resting comfortably in bed. No acute or major events overnight.    HOSPITAL COURSE  Day 1:   Day 2:   Day 3:     Conversion disorder with seizures or convulsions    Handoff    MEWS Score    Hypothyroidism    Seizure    Syncope    SYNCOPE    Hypothyroidism    SysAdmin_VisitLink        MEDICATIONS  STANDING MEDICATIONS  enoxaparin Injectable 40 milliGRAM(s) SubCutaneous every 24 hours  levothyroxine 75 MICROGram(s) Oral daily  omega-3-Acid Ethyl Esters 1 Gram(s) Oral daily    PRN MEDICATIONS    VITALS  T(F): 98 (12-31-24 @ 11:52), Max: 98.7 (12-31-24 @ 08:33)  HR: 71 (12-31-24 @ 11:52) (71 - 84)  BP: 127/79 (12-31-24 @ 11:52) (109/70 - 142/95)  RR: 18 (12-31-24 @ 11:52) (18 - 18)  SpO2: 98% (12-31-24 @ 11:52) (95% - 98%)    PHYSICAL EXAM  GENERAL: NAD, sitting in bed comfortably  HEAD:  Atraumatic, normocephalic  EYES: EOMI, PERRL  HEART: Regular rate and rhythm  LUNGS: Unlabored respirations.  Clear to auscultation bilaterally, no crackles, wheezing, or rhonchi  ABDOMEN: Soft, nontender, nondistended, +BS  EXTREMITIES: 2+ peripheral pulses bilaterally. No clubbing, cyanosis, or edema  NERVOUS SYSTEM:  A&Ox3, moving all extremities, no focal deficits       LABS             15.6   6.82  )-----------( 219      ( 12-31-24 @ 05:40 )             46.8     141  |  103  |  14  -------------------------<  99   12-31-24 @ 05:40  4.8  |  27  |  0.9    Ca      9.4     12-31-24 @ 05:40  Mg     2.0     12-30-24 @ 06:28    TPro  6.9  /  Alb  4.2  /  TBili  0.7  /  DBili  x   /  AST  18  /  ALT  24  /  AlkPhos  76  /  GGT  x     12-31-24 @ 05:40      Troponin T, High Sensitivity Result: <6 ng/L (12-29-24 @ 21:57)  Troponin T, High Sensitivity Result: 8 ng/L (12-29-24 @ 10:52)    Urinalysis Basic - ( 31 Dec 2024 05:40 )    Color: x / Appearance: x / SG: x / pH: x  Gluc: 99 mg/dL / Ketone: x  / Bili: x / Urobili: x   Blood: x / Protein: x / Nitrite: x   Leuk Esterase: x / RBC: x / WBC x   Sq Epi: x / Non Sq Epi: x / Bacteria: x

## 2024-12-31 NOTE — PROGRESS NOTE ADULT - ASSESSMENT
55 yo M PMHx hypothyroidism presented for evaluation of syncope. Pt's son woke him up from a deep sleep as he was in agonizing pain. Patient was immediately concerned causing stress and rapidly stood up. He ran downstairs to get his son cold water but felt very dizzy. He wanted to lie down but passed out. He reported urinary incontinece and felt extremely dizzy on his way to the ambulance. His dizziness only resolved upon ER arrival.    Syncope  - had dizziness/lightheadedness as prodrome and prolonged after event  - EEG negative  - echo shows LV mass  - check cardiac MRI - currently under going. As per reading radiologist should have report by 7 pm, however depending on results may need cardiology consultation. I explained to patient earlier today that depending on what time report results he may have to stay overnight.  - cardio eval pending HAYDEN results  - c/w telemetry  - check orthostatics    Hypothyroidism   - no palpable nodules on exam; no goiter   - c/w levothyroxine 75 mcg   - TSH elevated 14--could be due to acute stress--outpt follow up in 1-2 weeks for repeat TSH      #Diet: regular   #DVT pro: lovenox      #Progress Note Handoff:  Pending (specify):  Cardiac MRI results, likely dc in AM, doubt dc today.   Family discussion: As above with patient   Disposition: Home__x_/SNF___/Other________/Unknown at this time________  
55 yo M PMHx hypothyroidism presented for evaluation of syncope. Pt's son woke him up from a deep sleep as he was in agonizing pain. Patient was immediately concerned causing stress and rapidly stood up. He ran downstairs to get his son cold water but felt very dizzy. He wanted to lie down but passed out. He reported urinary incontinece and felt extremely dizzy on his way to the ambulance. His dizziness only resolved upon ER arrival.    Syncope  - had dizziness/lightheadedness as prodrome and prolonged after event  - EEG pending  - echo shows LV mass  - check cardiac MRI  - cardio eval  - c/w telemetry  - check orthostatics    Hypothyroidism   - no palpable nodules on exam; no goiter   - c/w levothyroxine 75 mcg       #Diet: regular   #DVT pro: lovenox      #Progress Note Handoff:  Pending (specify):  Cardiac MRI  Family discussion: As above with patient   Disposition: Home__x_/SNF___/Other________/Unknown at this time________  
#Syncope w seizure-like episode and prodrome of dizziness and lightheadedness  - likely vasovagal in the setting of stress  - CBC-CMP- VBG- lactate within Nl limits  - CT head negative   - ECG no ischemic changes; normal sinus rhythm  - CXR: no acute changes  - neuro and cardiac exams normal  - trop negative   - neuro recs appreciated: likely convulsive syncope  - Orthostatics: neg  - rEEG: No electrographic seizures or significant clinical events.  - TTE: . Hyperechoic exophytic mass (1.81 x 0.30 x 0.54 cm) in the LV cavity   attached to the apical lateral wall, concerning for primary versus   seconday tumor. Appearance is not consistent with LV thrombus. Recommend   cardiac MR for further characterization.   2. LV Ejection Fraction by Govea's Method with a biplane EF of 56 %.  - carotid duplex: less than 50% stenosis bilat  - f/u cardiac mri     #Hypothyroidism   - no palpable nodules on exam; no goiter   - c/w levothyroxine 75 mcg   - f up TSH (OP was 14)  - T3 normal

## 2024-12-31 NOTE — DISCHARGE NOTE NURSING/CASE MANAGEMENT/SOCIAL WORK - NSDCPEFALRISK_GEN_ALL_CORE
For information on Fall & Injury Prevention, visit: [email or web address] OR  [email or web address] OR  https://www.cdc.gov/steadi/patient.html

## 2024-12-31 NOTE — PROGRESS NOTE ADULT - SUBJECTIVE AND OBJECTIVE BOX
CHIEF COMPLAINT:    Patient is a 54y old  Male who presents with a chief complaint of syncope     INTERVAL HPI/OVERNIGHT EVENTS:    Patient seen and examined at bedside. No acute overnight events occurred.    ROS: Denies dizziness, SOB, chest pain. All other systems are negative.    Medications:  Standing  enoxaparin Injectable 40 milliGRAM(s) SubCutaneous every 24 hours  levothyroxine 75 MICROGram(s) Oral daily  omega-3-Acid Ethyl Esters 1 Gram(s) Oral daily    PRN Meds        Vital Signs:    T(F): 98 (12-31-24 @ 11:52), Max: 98.7 (12-31-24 @ 08:33)  HR: 71 (12-31-24 @ 11:52) (71 - 76)  BP: 127/79 (12-31-24 @ 11:52) (109/70 - 142/95)  RR: 18 (12-31-24 @ 11:52) (18 - 18)  SpO2: 98% (12-31-24 @ 11:52) (95% - 98%)  I&O's Summary    30 Dec 2024 07:01  -  31 Dec 2024 07:00  --------------------------------------------------------  IN: 222 mL / OUT: 0 mL / NET: 222 mL    PHYSICAL EXAM:  GENERAL:  NAD  SKIN: No rashes or lesions  HEENT: Atraumatic. Normocephalic. Anicteric  NECK:  No JVD.   PULMONARY: Clear to ausculation bilaterally. No wheezing. No rales  CVS: Normal S1, S2. Regular rate and rhythm. No murmurs.  ABDOMEN/GI: Soft, Nontender, Nondistended; Bowel sounds are present  EXTREMITIES:  No edema B/L LE.  NEUROLOGIC:  No motor deficit.  PSYCH: Alert & oriented x 3, normal affect      LABS:                        15.6   6.82  )-----------( 219      ( 31 Dec 2024 05:40 )             46.8     12-31    141  |  103  |  14  ----------------------------<  99  4.8   |  27  |  0.9    Ca    9.4      31 Dec 2024 05:40  Mg     2.0     12-30    TPro  6.9  /  Alb  4.2  /  TBili  0.7  /  DBili  x   /  AST  18  /  ALT  24  /  AlkPhos  76  12-31              RADIOLOGY & ADDITIONAL TESTS:  Imaging or report Personally Reviewed:  [ ] YES  [ ] NO -->no new images    Telemetry reviewed independently - NSR, no acute events  EKG reviewed independently -->no new EKGs    Consultant(s) Notes Reviewed:  [ ] YES  [ ] NO  Care Discussed with Consultants/Other Providers [ ] YES  [ ] NO    Case discussed with resident  Care discussed with pt

## 2024-12-31 NOTE — DISCHARGE NOTE NURSING/CASE MANAGEMENT/SOCIAL WORK - PATIENT PORTAL LINK FT
You can access the FollowMyHealth Patient Portal offered by [facility] by registering at the following website: [email or web address]. By joining [employer]’s FollowMyHealth portal, you will also be able to view your health information using other applications (apps) compatible with our system.

## 2024-12-31 NOTE — EEG REPORT - NS EEG TEXT BOX
[place] Department of Neurology  Inpatient Routine-EEG Report      Patient Name:	ALEXIS FERRO    :	1970  MRN:	-  Study Date/Time:	2024, 9:19:35 AM  Referred by:	-    Brief Clinical History:  ALEXIS FERRO is a 54 year old Male; study performed to investigate for seizures or markers of epilepsy.   Diagnosis Code: R40.4 Transient alteration of awareness    Patient Medication:  LEVONOX    -      Acquisition Details:  Electroencephalography was acquired using a minimum of 21 channels on an [employer] system v 9.3.1 with electrode placement according to the standard International 10-20 system following ACNS (American Clinical Neurophysiology Society) guidelines.  Anterior temporal T1 and T2 electrodes were utilized whenever possible.   The [employer] automated spike & seizure detections were all reviewed in detail, in addition to the entire raw EEG.    Findings:  Background:  continuous.   Voltage:  Normal (20uV)  Organization:  Appropriate anterior-posterior gradient  Posterior Dominant Rhythm:  10 Hz symmetric, well-organized, and well-modulated  Variability:  Yes	Reactivity:  Yes  Sleep:  Absent.  Focal abnormalities:  No persistent asymmetries of voltage or frequency.  Interictal Activity:  None  Focal Slowing:  None  Generalized Slowing:  No  Events:  1)	No electrographic seizures or significant clinical events.  Provocations:  1)	Hyperventilation: was not performed.  2)	Photic stimulation: was not performed.  Impression:  Normal REEG    Clinical Correlation:  Normal study does not exclude diagnosis of seizure disorder    Jessica Shen MD  Attending Neurologist, Division of Epilepsy

## 2025-01-06 DIAGNOSIS — F43.9 REACTION TO SEVERE STRESS, UNSPECIFIED: ICD-10-CM

## 2025-01-06 DIAGNOSIS — R55 SYNCOPE AND COLLAPSE: ICD-10-CM

## 2025-01-06 DIAGNOSIS — E03.9 HYPOTHYROIDISM, UNSPECIFIED: ICD-10-CM

## 2025-06-12 NOTE — PATIENT PROFILE ADULT - FUNCTIONAL SCREEN CURRENT LEVEL: COMMUNICATION, MLM
Vitals: VSS on RA, except HTN within parameters following am dose of losartan.   Neuros: Alert and oriented x4. 5/5 throughout. WFD-improving.   IV: PIV removed for discharge.  Resp: WNL on RA  Diet: Regular diet with carb counting.   GI: Last BM 6/9.  : Voiding  Skin: Head incision YORDY, Edema to BLE-marquez.  Pain: Utilizing prn tylenol for incisional pain.    Activity: Up with SBA, ambulating in hallway with wife.  Social: Wife at bedside and supportive.  Plan: Discharge to home.    Discharge time/date: 6/12, 1224  Walked or Wheelchair: wheelchair, escorted by RN  PIV removed: yes  Reviewed AVS with patient: wife and pt reviewed AVS- all questions answered.  Medication due times added to AVS in EPIC: yes  Verbalized understanding of discharge with teachback: yes  Medications retrieved from pharmacy: yes  Supplies sent home: CARLYLE  Belongings from security with patient: CARLYLE         0 = understands/communicates without difficulty